# Patient Record
Sex: FEMALE | Race: OTHER
[De-identification: names, ages, dates, MRNs, and addresses within clinical notes are randomized per-mention and may not be internally consistent; named-entity substitution may affect disease eponyms.]

---

## 2018-12-13 ENCOUNTER — HOSPITAL ENCOUNTER (EMERGENCY)
Dept: HOSPITAL 62 - ER | Age: 18
Discharge: HOME | End: 2018-12-13
Payer: SELF-PAY

## 2018-12-13 VITALS — DIASTOLIC BLOOD PRESSURE: 75 MMHG | SYSTOLIC BLOOD PRESSURE: 95 MMHG

## 2018-12-13 DIAGNOSIS — F17.200: ICD-10-CM

## 2018-12-13 DIAGNOSIS — J02.9: ICD-10-CM

## 2018-12-13 DIAGNOSIS — B34.9: Primary | ICD-10-CM

## 2018-12-13 DIAGNOSIS — R42: ICD-10-CM

## 2018-12-13 DIAGNOSIS — R53.81: ICD-10-CM

## 2018-12-13 PROCEDURE — 87880 STREP A ASSAY W/OPTIC: CPT

## 2018-12-13 PROCEDURE — 87070 CULTURE OTHR SPECIMN AEROBIC: CPT

## 2018-12-13 PROCEDURE — 87077 CULTURE AEROBIC IDENTIFY: CPT

## 2018-12-13 PROCEDURE — 86308 HETEROPHILE ANTIBODY SCREEN: CPT

## 2018-12-13 PROCEDURE — 36415 COLL VENOUS BLD VENIPUNCTURE: CPT

## 2018-12-13 PROCEDURE — 99282 EMERGENCY DEPT VISIT SF MDM: CPT

## 2018-12-13 NOTE — ER DOCUMENT REPORT
ED General





- General


Chief Complaint: Dizziness


Stated Complaint: DIZZY, BLURRED VISION, NECK/THROAT PAIN


Time Seen by Provider: 12/13/18 11:08


Mode of Arrival: Ambulatory


Information source: Patient


Notes: 





Chief complaint: Sore throat








History of complain:( obtained from----patient) 18 years old female presents 

today with sore throat fever chills general aches and pain malaise dizzy etc.  

For the last 2 days.








Onset: Gradual


Duration: Last 2 days


Severity: Moderate


Quality: Achy


Context: As above


Exacerbating factor and relieving factors: As above











REVIEW OF SYSTEMS:


CONSTITUTIONAL :  Denies fever,  chills, or sweats.  Denies recent illness.


EENT:   Denies eye, ear, throat, or mouth pain or symptoms.  Denies nasal or 

sinus congestion or discharge.  Denies throat, tongue, or mouth swelling or 

difficulty swallowing.


CARDIOVASCULAR:  Denies chest pain.  Denies palpitations or racing or irregular 

heart beat.  Denies ankle edema.


RESPIRATORY:  Denies cough, cold, or chest congestion.  Denies shortness of 

breath, difficulty breathing, or wheezing.


GASTROINTESTINAL:  Denies  distention.  Denies nausea, vomiting, or diarrhea.  

Denies blood in vomitus, stools, or per rectum.  Denies black, tarry stools.  

Denies constipation. 


GENITOURINARY:  Denies difficulty urinating, painful urination, burning, 

frequency, blood in urine, or discharge.


FEMALE  GENITOURINARY:  Denies vaginal bleeding, heavy or abnormal periods, 

irregular periods.  Denies vaginal discharge or odor. 


MUSCULOSKELETAL:  Denies back or neck pain or stiffness.  Denies joint pain or 

swelling.


SKIN:   Denies rash, lesions or sores.


HEMATOLOGIC :   Denies easy bruising or bleeding.


LYMPHATIC:  Denies swollen, enlarged glands.


NEUROLOGICAL:  Denies confusion or altered mental status.  Denies passing out 

or loss of consciousness.  Denies dizziness or lightheadedness.  Denies 

headache.  Denies weakness or paralysis or loss of use of either side.  Denies 

problems with gait or speech.  Denies sensory loss, numbness, or tingling.  

Denies seizures.


PSYCHIATRIC:  Denies anxiety or stress.  Denies depression, suicidal ideation, 

or homicidal ideation.





ALL OTHER SYSTEMS REVIEWED AND NEGATIVE.











PHYSICAL EXAMINATION:





GENERAL: Well-appearing, well-nourished and in no acute distress.





HEAD: Atraumatic, normocephalic.





EYES: Pupils equal round and reactive to light, extraocular movements intact, 

conjunctiva are normal.





ENT: Nares patent, orophar pharyngeal mucosa and tonsils erythematous with 

exudates with tonsillar enlargement..  Moist mucous membranes.





NECK: Normal range of motion, upper anterior cervical lymphadenopathy





LUNGS: Breath sounds clear to auscultation bilaterally and equal.  No wheezes 

rales or rhonchi.





HEART: Regular rate and rhythm without murmurs





ABDOMEN: Soft, nontender, nondistended abdomen.  No guarding, no rebound.  No 

masses appreciated.





Examination of genitals-deferred





Musculoskeletal: Normal range of motion, no pitting or edema.  No cyanosis.





NEUROLOGICAL: Cranial nerves grossly intact.  Normal speech, normal gait.  

Normal sensory, motor exams





PSYCH: Normal mood, normal affect.





SKIN: Warm, Dry, normal turgor, no rashes or lesions noted.

















Dictation was performed using Dragon voice recognition software


TRAVEL OUTSIDE OF THE [place] IN LAST 30 DAYS: No





- HPI


Onset/Duration: Gradual


Notes: 





Dictated





- Related Data


Allergies/Adverse Reactions: 


 





No Known Allergies Allergy (Unverified 12/13/18 10:32)


 











Past Medical History





- Social History


Smoking Status: Current Every Day Smoker


Frequency of alcohol use: Rare


Drug Abuse: None


Lives with: Family


Family History: Reviewed & Not Pertinent





Review of Systems





- Review of Systems


Notes: 





Dictated





Physical Exam





- Vital signs


Vitals: 


 











Temp Pulse Resp BP Pulse Ox


 


 99.3 F   115 H  20   106/67   97 


 


 12/13/18 10:34  12/13/18 10:34  12/13/18 10:34  12/13/18 10:34  12/13/18 10:34














- Notes


Notes: 





Dictated





Course





- Vital Signs


Vital signs: 


 











Temp Pulse Resp BP Pulse Ox


 


 99.3 F   115 H  20   106/67   97 


 


 12/13/18 10:34  12/13/18 10:34  12/13/18 10:34  12/13/18 10:34  12/13/18 10:34














Discharge





- Discharge


Clinical Impression: 


 Viral syndrome





Pharyngitis


Qualifiers:


 Pharyngitis/tonsillitis etiology: unspecified etiology Qualified Code(s): 

J02.9 - Acute pharyngitis, unspecified





Condition: Fair


Disposition: HOME, SELF-CARE


Instructions:  Viral Syndrome ([facility]), Fever ([facility]), Dizziness ([facility])


Prescriptions: 


Amoxicillin 1 tab PO QID #40 tab

## 2019-08-31 ENCOUNTER — HOSPITAL ENCOUNTER (EMERGENCY)
Dept: HOSPITAL 62 - ER | Age: 19
Discharge: HOME | End: 2019-08-31
Payer: SELF-PAY

## 2019-08-31 VITALS — SYSTOLIC BLOOD PRESSURE: 108 MMHG | DIASTOLIC BLOOD PRESSURE: 59 MMHG

## 2019-08-31 DIAGNOSIS — F17.200: ICD-10-CM

## 2019-08-31 DIAGNOSIS — R42: ICD-10-CM

## 2019-08-31 DIAGNOSIS — R00.2: Primary | ICD-10-CM

## 2019-08-31 DIAGNOSIS — M54.6: ICD-10-CM

## 2019-08-31 LAB
ADD MANUAL DIFF: NO
ALBUMIN SERPL-MCNC: 5.1 G/DL (ref 3.5–5)
ALP SERPL-CCNC: 81 U/L (ref 38–126)
ANION GAP SERPL CALC-SCNC: 13 MMOL/L (ref 5–19)
AST SERPL-CCNC: 77 U/L (ref 14–36)
BASOPHILS # BLD AUTO: 0.1 10^3/UL (ref 0–0.2)
BASOPHILS NFR BLD AUTO: 0.7 % (ref 0–2)
BILIRUB DIRECT SERPL-MCNC: 0.3 MG/DL (ref 0–0.4)
BILIRUB SERPL-MCNC: 0.7 MG/DL (ref 0.2–1.3)
BUN SERPL-MCNC: 12 MG/DL (ref 7–20)
CALCIUM: 9.9 MG/DL (ref 8.4–10.2)
CHLORIDE SERPL-SCNC: 104 MMOL/L (ref 98–107)
CO2 SERPL-SCNC: 26 MMOL/L (ref 22–30)
EOSINOPHIL # BLD AUTO: 0 10^3/UL (ref 0–0.6)
EOSINOPHIL NFR BLD AUTO: 0.4 % (ref 0–6)
ERYTHROCYTE [DISTWIDTH] IN BLOOD BY AUTOMATED COUNT: 13.7 % (ref 11.5–14)
GLUCOSE SERPL-MCNC: 87 MG/DL (ref 75–110)
HCT VFR BLD CALC: 39.1 % (ref 36–47)
HGB BLD-MCNC: 13.1 G/DL (ref 12–15.5)
LYMPHOCYTES # BLD AUTO: 2.4 10^3/UL (ref 0.5–4.7)
LYMPHOCYTES NFR BLD AUTO: 25.2 % (ref 13–45)
MCH RBC QN AUTO: 28.9 PG (ref 27–33.4)
MCHC RBC AUTO-ENTMCNC: 33.5 G/DL (ref 32–36)
MCV RBC AUTO: 86 FL (ref 80–97)
MONOCYTES # BLD AUTO: 0.5 10^3/UL (ref 0.1–1.4)
MONOCYTES NFR BLD AUTO: 5.2 % (ref 3–13)
NEUTROPHILS # BLD AUTO: 6.5 10^3/UL (ref 1.7–8.2)
NEUTS SEG NFR BLD AUTO: 68.5 % (ref 42–78)
PLATELET # BLD: 295 10^3/UL (ref 150–450)
POTASSIUM SERPL-SCNC: 4.4 MMOL/L (ref 3.6–5)
PROT SERPL-MCNC: 8.8 G/DL (ref 6.3–8.2)
RBC # BLD AUTO: 4.53 10^6/UL (ref 3.72–5.28)
TOTAL CELLS COUNTED % (AUTO): 100 %
WBC # BLD AUTO: 9.5 10^3/UL (ref 4–10.5)

## 2019-08-31 PROCEDURE — 96361 HYDRATE IV INFUSION ADD-ON: CPT

## 2019-08-31 PROCEDURE — 80053 COMPREHEN METABOLIC PANEL: CPT

## 2019-08-31 PROCEDURE — 36415 COLL VENOUS BLD VENIPUNCTURE: CPT

## 2019-08-31 PROCEDURE — 93005 ELECTROCARDIOGRAM TRACING: CPT

## 2019-08-31 PROCEDURE — 84703 CHORIONIC GONADOTROPIN ASSAY: CPT

## 2019-08-31 PROCEDURE — 71046 X-RAY EXAM CHEST 2 VIEWS: CPT

## 2019-08-31 PROCEDURE — 93010 ELECTROCARDIOGRAM REPORT: CPT

## 2019-08-31 PROCEDURE — 84443 ASSAY THYROID STIM HORMONE: CPT

## 2019-08-31 PROCEDURE — 83735 ASSAY OF MAGNESIUM: CPT

## 2019-08-31 PROCEDURE — 99284 EMERGENCY DEPT VISIT MOD MDM: CPT

## 2019-08-31 PROCEDURE — 96374 THER/PROPH/DIAG INJ IV PUSH: CPT

## 2019-08-31 PROCEDURE — 85025 COMPLETE CBC W/AUTO DIFF WBC: CPT

## 2019-08-31 NOTE — ER DOCUMENT REPORT
ED General





- General


Chief Complaint: Near Syncope


Stated Complaint: DIZZY


Time Seen by Provider: 08/31/19 16:07


Primary Care Provider: 


QUOC GRIFFIN MD [Primary Care Provider] - Follow up as needed


Mode of Arrival: Ambulatory


TRAVEL OUTSIDE OF THE [place] IN LAST 30 DAYS: No





- HPI


Notes: 





Patient is a 18-year-old female with no significant past medical history who 

presents complaining of intermittent episodes of palpitations and feeling a 

little lightheaded over the past couple years but has increased in frequency to 

1-2 times per week over the past 1 to 2 months.  Patient states that she had an 

incident prior to arrival where she had a near syncopal event, but did not 

completely lose consciousness.  Patient states that that is the furthest it has 

progressed out of her previous incidences.  Patient states that she did have 

some soreness to her left thoracic area during this time as well as 

palpitations.  Patient states that she currently just feels fatigued at this 

time and her other symptoms have resolved.  She has been able to eat and drink 

without difficulty.  She is urinating normally and having normal bowel 

movements.  She does not take any medicines daily.  She does admit to smoking on

occasion, but denies any drugs or alcohol.  She has not been seen by a 

specialist for this issue previously.  Denies drug allergies.  Denies any 

headache, fever, head injury, neck pain, changes in 

vision/speech/mentation/hearing, URI, sore throat, chest pain, palpitations, 

cough, shortness of breath, wheeze, dyspnea, abdominal pain, nausea/vom

iting/diarrhea, urinary retention, dysuria, hematuria, loss of control of bowel 

or bladder, numbness/tingling, saddle anesthesia, muscle paralysis/weakness, or 

rash.





- Related Data


Allergies/Adverse Reactions: 


                                        





No Known Allergies Allergy (Unverified 12/13/18 10:32)


   











Past Medical History





- General


Information source: Patient





- Social History


Smoking Status: Current Some Day Smoker


Family History: Reviewed & Not Pertinent


Patient has suicidal ideation: No


Patient has homicidal ideation: No


Renal/ Medical History: Denies: Hx Peritoneal Dialysis





Review of Systems





- Review of Systems


-: Yes All other systems reviewed and negative





Physical Exam





- Vital signs


Vitals: 


                                        











Temp Pulse Resp BP Pulse Ox


 


 98.5 F   88   14   117/78   98 


 


 08/31/19 16:07  08/31/19 16:07  08/31/19 16:07  08/31/19 16:07  08/31/19 16:07














- Notes


Notes: 





PHYSICAL EXAMINATION:  





GENERAL: Well-appearing, well-nourished and in no acute distress.  A&Ox4.  

Answers questions appropriately.





HEAD: Atraumatic, normocephalic.  Non-tender. 





EYES: Pupils equal round and reactive to light, extraocular movements intact, 

sclera anicteric, conjunctiva are normal.  No nystagmus.  





ENT: EAC clear b/l.  TM's intact b/l without erythema, fluid, or perforation.  

Nares patent and without discharge.  oropharynx clear without exudates.  No 

tonsilar hypertrophy or erythema.  Moist mucous membranes.  No sinus tenderness.







NECK: Normal range of motion, supple without lymphadenopathy.  No rigidity/meni

ngismus.  No midline tenderness. 





LUNGS: Breath sounds clear to auscultation bilaterally and equal.  No wheezes 

rales or rhonchi.





HEART: Regular rate and rhythm at this time with occ physiologic s2 noted with 

respirations.  She did show a sinus regular irregular rhythm on EKG.





ABDOMEN: Soft, nontender, nondistended abdomen.  No guarding, no rebound.  

Normal bowel sounds present.  No CVA tenderness bilaterally.  





Musculoskeletal: Ext b/l:  FROM to passive/active. Strength 5+/5.  No deficits n

oted.  No bony tenderness of extremities.





Extremities:  No cyanosis, clubbing, or edema b/l.  Peripheral pulses 2+.  

Capillary refill less than 2 seconds.





NEUROLOGICAL: NIH 0.  GCS 15.  Cranial nerves grossly intact.  Normal speech, 

normal gait.  Normal sensory, motor exams.  Reflexes 2+ b/l. DOTTY's negative.  

Pronator drift negative. Heel/shin, finger/nose wnl. Romberg neg.





PSYCH: Normal mood, normal affect.





SKIN: Warm, Dry, normal turgor, no rashes or lesions noted.





Course





- Re-evaluation


Re-evalutation: 





08/31/19 18:54


Patient is an afebrile, well-hydrated 18-year-old female who presents to the ED 

with near syncopal event/palpitations.  Vitals are acceptable without any 

significant tachycardia, tachypnea, or hypoxia.  PE is otherwise unremarkable 

for any focal neurological deficits.  Patient is nontoxic-appearing and is 

tolerating p.o. without any difficulties.  Pt is currently asymptomatic.  

Patient has not had any recurrences of symptoms throughout her stay.  

Orthostatics negative.  CBC, CMP, Thyroid, Magnesium, HCG, EKG, chest x-ray are 

all unremarkable for any acute pathology.  Patient has a Wells score of 0, and 

is PERC negative.  Patient does not have any chest pain, dyspnea, or shortness 

of breath.  Patient's presentation and symptomatology creates low suspicion for 

ACS, PE, pneumothorax, pericarditis, dissection, respiratory compromise, severe 

dehydration, sepsis, meningitis, acute intracranial pathology, or other systemic

emergent condition at this time.  Patient is aware that this condition can 

change from initial presentation and she needs to monitor symptoms closely and 

seek medical attention for any acute changes.  Pt is feeling better and would 

like to go home.  Recommend conservative measures for symptoms.  Recheck with 

your PCM in 2-3 days. Consider consult with Cardiology.  Reviewed possible 

Holter monitor/event monitor.  Return to the ED with any worsening/concerning 

symptoms otherwise as reviewed in discharge.  Patient is in agreement.





- Vital Signs


Vital signs: 


                                        











Temp Pulse Resp BP Pulse Ox


 


 98.5 F   61   14   97/61 L  98 


 


 08/31/19 16:07  08/31/19 17:46  08/31/19 16:07  08/31/19 17:46  08/31/19 16:07














- Laboratory


Result Diagrams: 


                                 08/31/19 16:55





                                 08/31/19 16:55


Laboratory results interpreted by me: 


                                        











  08/31/19





  16:55


 


AST  77 H


 


Total Protein  8.8 H


 


Albumin  5.1 H














Discharge





- Discharge


Clinical Impression: 


 Near syncope, Palpitations





Condition: Stable


Disposition: HOME, SELF-CARE


Instructions:  Palpitations (Irregular or Rapid Heartrate) ([facility]), Near Syncopal 

Episode ([facility])


Additional Instructions: 


Maintain adequate fluid and food intake


Healthy diet


Monitor blood pressure daily and keep a log


Monitor symptoms for any acute changes


Consider Holter monitor/cardiac event monitor


Recheck with your PCM in 2-3 days


Consider a follow-up with cardiology 


Return to the ED with any worsening symptoms and/or development of fever, 

headache, chest pain, palpitations, syncope, shortness of breath, trouble 

breathing, abdominal pain, n/v/d, blood in stool/urine, loss of control of 

bowel/bladder, urinary retention, muscle weakness/paralysis, numbness/tingling, 

or other worsening symptoms that are concerning to you.


Forms:  Smoking Cessation Education


Referrals: 


QUOC GRIFFIN MD [Primary Care Provider] - Follow up as needed


DIXIE YUAN MD [ACTIVE STAFF] - Follow up as needed


MELLY ANTUNEZ MD [ACTIVE STAFF] - Follow up as needed

## 2019-08-31 NOTE — ER DOCUMENT REPORT
ED Medical Screen (RME)





- General


Chief Complaint: Near Syncope


Stated Complaint: DIZZY


Time Seen by Provider: 08/31/19 16:07


Mode of Arrival: Ambulatory


Information source: Patient


Notes: 





Patient presents complaining of dizziness that started this afternoon.  Patient 

complains of left side rib and posterior thoracic back pain.  Patient denies any

anterior chest pain shortness of breath.  Patient does complain of some nausea.





hx: None





I have greeted and performed a rapid initial assessment of this patient.  A 

comprehensive ED assessment and evaluation of the patient, analysis of test r

esults and completion of the medical decision making process will be conducted 

by additional ED providers.


TRAVEL OUTSIDE OF THE [place] IN LAST 30 DAYS: No





- Related Data


Allergies/Adverse Reactions: 


                                        





No Known Allergies Allergy (Unverified 12/13/18 10:32)


   











Past Medical History


Renal/ Medical History: Denies: Hx Peritoneal Dialysis





Physical Exam





- Vital signs


Vitals: 





                                        











Temp Pulse Resp BP Pulse Ox


 


 98.5 F   88   14   117/78   98 


 


 08/31/19 16:07  08/31/19 16:07  08/31/19 16:07  08/31/19 16:07  08/31/19 16:07














- Cardiovascular


Rhythm: Regular


Heart sounds: S1 appreciated, S2 appreciated


Murmur: No





Course





- Vital Signs


Vital signs: 





                                        











Temp Pulse Resp BP Pulse Ox


 


 98.5 F   88   14   117/78   98 


 


 08/31/19 16:07  08/31/19 16:07  08/31/19 16:07  08/31/19 16:07  08/31/19 16:07

## 2019-08-31 NOTE — RADIOLOGY REPORT (SQ)
EXAM DESCRIPTION:  CHEST 2 VIEWS



COMPLETED DATE/TIME:  8/31/2019 5:18 pm



REASON FOR STUDY:  L thoracic back pain



COMPARISON:  None.



EXAM PARAMETERS:  NUMBER OF VIEWS: two views

TECHNIQUE: Digital Frontal and Lateral radiographic views of the chest acquired.

RADIATION DOSE: NA

LIMITATIONS: none



FINDINGS:  LUNGS AND PLEURA: No opacities, masses or pneumothorax. No pleural effusion.

MEDIASTINUM AND HILAR STRUCTURES: No masses or contour abnormalities.

HEART AND VASCULAR STRUCTURES: Heart normal size.  No evidence for failure.

BONES: No acute findings.

HARDWARE: None in the chest.

OTHER: No other significant finding.



IMPRESSION:  NO ACUTE RADIOGRAPHIC FINDING IN THE CHEST.



TECHNICAL DOCUMENTATION:  JOB ID:  [id number]

[id number]

 2011 [employer]- All Rights Reserved



Reading location - IP/workstation name: [email or web address]

## 2019-08-31 NOTE — EKG REPORT
SEVERITY:- BORDERLINE ECG -

SINUS ARRHYTHMIA, RATE 60-85

BORDERLINE T ABNORMALITIES, ANT-LAT LEADS

:

Confirmed by: Nate Tovar MD 31-Aug-2019 21:08:36

## 2019-09-29 ENCOUNTER — HOSPITAL ENCOUNTER (EMERGENCY)
Dept: HOSPITAL 62 - ER | Age: 19
Discharge: HOME | End: 2019-09-29
Payer: SELF-PAY

## 2019-09-29 VITALS — SYSTOLIC BLOOD PRESSURE: 99 MMHG | DIASTOLIC BLOOD PRESSURE: 68 MMHG

## 2019-09-29 DIAGNOSIS — R20.0: ICD-10-CM

## 2019-09-29 DIAGNOSIS — R06.02: ICD-10-CM

## 2019-09-29 DIAGNOSIS — I95.1: Primary | ICD-10-CM

## 2019-09-29 DIAGNOSIS — R07.9: ICD-10-CM

## 2019-09-29 LAB
ADD MANUAL DIFF: NO
ALBUMIN SERPL-MCNC: 4.8 G/DL (ref 3.5–5)
ALP SERPL-CCNC: 69 U/L (ref 38–126)
ANION GAP SERPL CALC-SCNC: 8 MMOL/L (ref 5–19)
APPEARANCE UR: CLEAR
APTT PPP: YELLOW S
AST SERPL-CCNC: 28 U/L (ref 14–36)
BASOPHILS # BLD AUTO: 0.1 10^3/UL (ref 0–0.2)
BASOPHILS NFR BLD AUTO: 0.7 % (ref 0–2)
BILIRUB DIRECT SERPL-MCNC: 0 MG/DL (ref 0–0.4)
BILIRUB SERPL-MCNC: 0.8 MG/DL (ref 0.2–1.3)
BILIRUB UR QL STRIP: NEGATIVE
BUN SERPL-MCNC: 12 MG/DL (ref 7–20)
CALCIUM: 9.4 MG/DL (ref 8.4–10.2)
CHLORIDE SERPL-SCNC: 104 MMOL/L (ref 98–107)
CO2 SERPL-SCNC: 27 MMOL/L (ref 22–30)
EOSINOPHIL # BLD AUTO: 0.1 10^3/UL (ref 0–0.6)
EOSINOPHIL NFR BLD AUTO: 1.1 % (ref 0–6)
ERYTHROCYTE [DISTWIDTH] IN BLOOD BY AUTOMATED COUNT: 13.4 % (ref 11.5–14)
GLUCOSE SERPL-MCNC: 87 MG/DL (ref 75–110)
GLUCOSE UR STRIP-MCNC: NEGATIVE MG/DL
HCT VFR BLD CALC: 38.7 % (ref 36–47)
HGB BLD-MCNC: 13.1 G/DL (ref 12–15.5)
KETONES UR STRIP-MCNC: NEGATIVE MG/DL
LYMPHOCYTES # BLD AUTO: 2.4 10^3/UL (ref 0.5–4.7)
LYMPHOCYTES NFR BLD AUTO: 31.2 % (ref 13–45)
MCH RBC QN AUTO: 29.5 PG (ref 27–33.4)
MCHC RBC AUTO-ENTMCNC: 33.7 G/DL (ref 32–36)
MCV RBC AUTO: 87 FL (ref 80–97)
MONOCYTES # BLD AUTO: 0.5 10^3/UL (ref 0.1–1.4)
MONOCYTES NFR BLD AUTO: 6.2 % (ref 3–13)
NEUTROPHILS # BLD AUTO: 4.7 10^3/UL (ref 1.7–8.2)
NEUTS SEG NFR BLD AUTO: 60.8 % (ref 42–78)
NITRITE UR QL STRIP: NEGATIVE
PH UR STRIP: 7 [PH] (ref 5–9)
PLATELET # BLD: 285 10^3/UL (ref 150–450)
POTASSIUM SERPL-SCNC: 4 MMOL/L (ref 3.6–5)
PROT SERPL-MCNC: 8.3 G/DL (ref 6.3–8.2)
PROT UR STRIP-MCNC: NEGATIVE MG/DL
RBC # BLD AUTO: 4.43 10^6/UL (ref 3.72–5.28)
SP GR UR STRIP: 1.01
TOTAL CELLS COUNTED % (AUTO): 100 %
UROBILINOGEN UR-MCNC: 2 MG/DL (ref ?–2)
WBC # BLD AUTO: 7.7 10^3/UL (ref 4–10.5)

## 2019-09-29 PROCEDURE — 99285 EMERGENCY DEPT VISIT HI MDM: CPT

## 2019-09-29 PROCEDURE — 93005 ELECTROCARDIOGRAM TRACING: CPT

## 2019-09-29 PROCEDURE — 84703 CHORIONIC GONADOTROPIN ASSAY: CPT

## 2019-09-29 PROCEDURE — 80053 COMPREHEN METABOLIC PANEL: CPT

## 2019-09-29 PROCEDURE — 93010 ELECTROCARDIOGRAM REPORT: CPT

## 2019-09-29 PROCEDURE — 36415 COLL VENOUS BLD VENIPUNCTURE: CPT

## 2019-09-29 PROCEDURE — 81001 URINALYSIS AUTO W/SCOPE: CPT

## 2019-09-29 PROCEDURE — 85025 COMPLETE CBC W/AUTO DIFF WBC: CPT

## 2019-09-29 PROCEDURE — 71046 X-RAY EXAM CHEST 2 VIEWS: CPT

## 2019-09-29 NOTE — ER DOCUMENT REPORT
ED Medical Screen (RME)





- General


Chief Complaint: Near Syncope


Stated Complaint: CHEST PAIN


Time Seen by Provider: 09/29/19 13:45


Primary Care Provider: 


QUOC GRIFFIN MD [Primary Care Provider] - Follow up as needed


Information source: Patient


Notes: 





Patient presents complaining of feeling faint.  Patient reports left-sided chest

pain that is presently resolved.  Patient reports tingling to the left arm and 

left lower leg.  Patient also complains of headache as well.





I have greeted and performed a rapid initial assessment of this patient.  A 

comprehensive ED assessment and evaluation of the patient, analysis of test 

results and completion of the medical decision making process will be conducted 

by additional ED providers.


TRAVEL OUTSIDE OF THE [place] IN LAST 30 DAYS: No





- Related Data


Allergies/Adverse Reactions: 


                                        





No Known Allergies Allergy (Verified 09/29/19 13:43)


   











Past Medical History





- Social History


Chew tobacco use (# tins/day): No


Frequency of alcohol use: None


Drug Abuse: None


Renal/ Medical History: Denies: Hx Peritoneal Dialysis





Physical Exam





- Vital signs


Vitals: 





                                        











Temp Pulse Resp BP Pulse Ox


 


 98.5 F   77   16   99/68 L  99 


 


 09/29/19 13:21 09/29/19 13:21 09/29/19 13:21 09/29/19 13:21 09/29/19 13:21














- Cardiovascular


Rhythm: Regular


Heart sounds: S1 appreciated, S2 appreciated





Course





- Vital Signs


Vital signs: 





                                        











Temp Pulse Resp BP Pulse Ox


 


 98.5 F   77   16   99/68 L  99 


 


 09/29/19 13:21 09/29/19 13:21 09/29/19 13:21 09/29/19 13:21 09/29/19 13:21














Doctor's Discharge





- Discharge


Referrals: 


QUOC GRIFFIN MD [Primary Care Provider] - Follow up as needed

## 2019-09-29 NOTE — RADIOLOGY REPORT (SQ)
EXAM DESCRIPTION:  CHEST 2 VIEWS



COMPLETED DATE/TIME:  9/29/2019 2:54 pm



REASON FOR STUDY:  cp



COMPARISON:  Two-view chest 8/31/2019



EXAM PARAMETERS:  NUMBER OF VIEWS: two views

TECHNIQUE: Digital Frontal and Lateral radiographic views of the chest acquired.

RADIATION DOSE: NA

LIMITATIONS: none



FINDINGS:  LUNGS AND PLEURA: No opacities, masses or pneumothorax. No pleural effusion.

MEDIASTINUM AND HILAR STRUCTURES: No masses or contour abnormalities.

HEART AND VASCULAR STRUCTURES: Heart normal size.  No evidence for failure.

BONES: No acute findings.

HARDWARE: None in the chest.

OTHER: No other significant finding.



IMPRESSION:  NO ACUTE RADIOGRAPHIC FINDING IN THE CHEST.



TECHNICAL DOCUMENTATION:  JOB ID:  [id number]

 2011 [email or web address]- All Rights Reserved



Reading location - IP/workstation name: [email or web address]

## 2019-09-29 NOTE — ER DOCUMENT REPORT
ED General





- General


Chief Complaint: Near Syncope


Stated Complaint: CHEST PAIN


Time Seen by Provider: 09/29/19 13:45


Primary Care Provider: 


QUOC GRIFFIN MD [Primary Care Provider] - Follow up as needed


TRAVEL OUTSIDE OF THE [place] IN LAST 30 DAYS: No





- HPI


Notes: 





Patient is a 18-year-old female that presents to the emergency department for 

chief complaint of near syncope and chest pain.


Patient states today while sitting in a car she began to have a "stabbing" pain 

on the left side of her chest.  The pain did not radiate.  She states she then 

felt some shortness of breath and numbness in her left arm and leg.  The 

symptoms lasted for about 2 hours prior to completely resolving.  Currently she 

states she is having a mild diffuse headache but otherwise feels normal.  She 

states that when the pain was there she felt like she was going to pass out and 

was lightheaded with position changes.  Patient states she has had similar 

presentations a few weeks ago and had been referred to cardiology but has not 

yet followed up.  Prior to onset of symptoms patient had been outside fishing in

90 degree weather for a few hours.  She states she has not had much to eat or 

drink today.  She denies any fever, chills, nausea and vomiting.  She denies any

recent surgery, travel, immobilization, lower extremity cramping or swelling, or

exogenous estrogen use.  She has no history of cancer.





Past Medical History: Negative





Past Surgical History: Negative





Social History: Denies drugs alcohol and tobacco





Family History: Reviewed and noncontributory for presenting illness





Allergies: Reviewed, see documented allergy list.








REVIEW OF SYSTEMS:





CONSTITUTIONAL : 





No fever





No chills





No diaphoresis





No recent illness





EENT:





No vision changes





No congestion





No sore throat  





CARDIOVASCULAR:





 chest pain





No palpitations





RESPIRATORY:





 shortness of breath





No cough





No difficulty breathing





GASTROINTESTINAL: 





No abdominal pain





No nausea





No vomiting





No diarrhea





GENITOURINARY:





No dysuria





No hematuria





No difficulty urinating





MUSCULOSKELETAL:





No back pain





No leg pain





No arm pain





SKIN:  





No rashes





No lesions





LYMPHATIC: 





No swollen, enlarged glands.





NEUROLOGICAL: 





 lightheadedness





 headache





No weakness





No paresthesias





PSYCHIATRIC:





No anxiety





No depression 








PHYSICAL EXAMINATION:





Vital signs reviewed, nursing noted reviewed.





GENERAL: Well-appearing, well-nourished and in no acute distress.





HEAD: Atraumatic, normocephalic.





EYES: Eyes appear normal, extraocular movements intact, sclera anicteric, 

conjunctiva are normal.





ENT: nares patent, oropharynx clear without exudates.  Moist mucous membranes.





NECK: Normal range of motion, supple without lymphadenopathy





LUNGS: Breath sounds clear to auscultation bilaterally and equal.  No wheezes 

rales or rhonchi.





HEART: Regular rate and rhythm without murmurs, +2/4 bilateral radial and DP 

pulses





ABDOMEN: Soft, nontender, normoactive bowel sounds.  No rebound, guarding, or 

rigidity. No masses appreciated.





EXTREMITIES: Nontender, good range of motion, no pitting or edema. 





NEUROLOGICAL: No focal neurological deficits. Moves all extremities 

spontaneously Motor and sensory grossly intact on exam.





PSYCH: Normal mood, normal affect.





SKIN: Warm, Dry, normal turgor, no rashes or lesions noted on exposed skin











- Related Data


Allergies/Adverse Reactions: 


                                        





No Known Allergies Allergy (Verified 09/29/19 13:43)


   











Past Medical History





- General


Information source: Patient





- Social History


Smoking Status: Never Smoker


Chew tobacco use (# tins/day): No


Frequency of alcohol use: None


Drug Abuse: None


Family History: Reviewed & Not Pertinent


Patient has suicidal ideation: No


Patient has homicidal ideation: No


Renal/ Medical History: Denies: Hx Peritoneal Dialysis





Physical Exam





- Vital signs


Vitals: 





                                        











Temp Pulse Resp BP Pulse Ox


 


 98.5 F   77   16   99/68 L  99 


 


 09/29/19 13:21  09/29/19 13:21  09/29/19 13:21  09/29/19 13:21  09/29/19 13:21














Course





- Re-evaluation


Re-evalutation: 





09/29/19 18:05


Vitals reviewed.  Nursing notes reviewed.  Patient is well-appearing and in no 

acute distress.  Currently she is only having a mild headache which will be 

treated with Tylenol.  Her lab work today shows no acute anemia.  She has no 

leukocytosis to suggest underlying infection.  EKG shows no dysrhythmia or 

ectopy.  Patient had been exposed to 90 degree weather 4 hours prior to the 

onset of her lightheadedness and chest pain.  Her blood pressure usually runs in

the 90s.  Patient symptoms consistent with orthostatic hypotension.  She was 

counseled on hydration especially when exposed to heat.  Her chest pain is 

nonspecific however she is PERC criteria and Wells criteria negative and I am 

not clinically suspicious for PE.  Patient had previously been referred to 

cardiology for concern of dysrhythmia and palpitations which she was encouraged 

to keep for follow-up.  She will return for new or worsening symptoms.  She is 

stable for discharge.





Laboratory











  09/29/19 09/29/19 09/29/19





  15:06 15:06 15:06


 


WBC  7.7  


 


RBC  4.43  


 


Hgb  13.1  


 


Hct  38.7  


 


MCV  87  


 


MCH  29.5  


 


MCHC  33.7  


 


RDW  13.4  


 


Plt Count  285  


 


Lymph % (Auto)  31.2  


 


Mono % (Auto)  6.2  


 


Eos % (Auto)  1.1  


 


Baso % (Auto)  0.7  


 


Absolute Neuts (auto)  4.7  


 


Absolute Lymphs (auto)  2.4  


 


Absolute Monos (auto)  0.5  


 


Absolute Eos (auto)  0.1  


 


Absolute Basos (auto)  0.1  


 


Seg Neutrophils %  60.8  


 


Sodium   139.2 


 


Potassium   4.0 


 


Chloride   104 


 


Carbon Dioxide   27 


 


Anion Gap   8 


 


BUN   12 


 


Creatinine   0.69 


 


Est GFR ( Amer)   > 60 


 


Est GFR (MDRD) Non-Af   > 60 


 


Glucose   87 


 


Calcium   9.4 


 


Total Bilirubin   0.8 


 


Direct Bilirubin   0.0 


 


Neonat Total Bilirubin   Not Reportable 


 


Neonat Direct Bilirubin   Not Reportable 


 


Neonat Indirect Bili   Not Reportable 


 


AST   28 


 


ALT   17 


 


Alkaline Phosphatase   69 


 


Total Protein   8.3 H 


 


Albumin   4.8 


 


Serum HCG, Qual    NEGATIVE


 


Urine Color   


 


Urine Appearance   


 


Urine pH   


 


Ur Specific Gravity   


 


Urine Protein   


 


Urine Glucose (UA)   


 


Urine Ketones   


 


Urine Blood   


 


Urine Nitrite   


 


Urine Bilirubin   


 


Urine Urobilinogen   


 


Ur Leukocyte Esterase   


 


Urine WBC (Auto)   


 


Urine RBC (Auto)   


 


Urine Bacteria (Auto)   


 


Squamous Epi Cells Auto   


 


Urine Mucus (Auto)   


 


Urine Ascorbic Acid   














  09/29/19





  15:06


 


WBC 


 


RBC 


 


Hgb 


 


Hct 


 


MCV 


 


MCH 


 


MCHC 


 


RDW 


 


Plt Count 


 


Lymph % (Auto) 


 


Mono % (Auto) 


 


Eos % (Auto) 


 


Baso % (Auto) 


 


Absolute Neuts (auto) 


 


Absolute Lymphs (auto) 


 


Absolute Monos (auto) 


 


Absolute Eos (auto) 


 


Absolute Basos (auto) 


 


Seg Neutrophils % 


 


Sodium 


 


Potassium 


 


Chloride 


 


Carbon Dioxide 


 


Anion Gap 


 


BUN 


 


Creatinine 


 


Est GFR ( Amer) 


 


Est GFR (MDRD) Non-Af 


 


Glucose 


 


Calcium 


 


Total Bilirubin 


 


Direct Bilirubin 


 


Neonat Total Bilirubin 


 


Neonat Direct Bilirubin 


 


Neonat Indirect Bili 


 


AST 


 


ALT 


 


Alkaline Phosphatase 


 


Total Protein 


 


Albumin 


 


Serum HCG, Qual 


 


Urine Color  YELLOW


 


Urine Appearance  CLEAR


 


Urine pH  7.0


 


Ur Specific Gravity  1.012


 


Urine Protein  NEGATIVE


 


Urine Glucose (UA)  NEGATIVE


 


Urine Ketones  NEGATIVE


 


Urine Blood  SMALL H


 


Urine Nitrite  NEGATIVE


 


Urine Bilirubin  NEGATIVE


 


Urine Urobilinogen  2.0 H


 


Ur Leukocyte Esterase  NEGATIVE


 


Urine WBC (Auto)  3


 


Urine RBC (Auto)  2


 


Urine Bacteria (Auto)  TRACE


 


Squamous Epi Cells Auto  3


 


Urine Mucus (Auto)  RARE


 


Urine Ascorbic Acid  NEGATIVE











                                        





Chest X-Ray  09/29/19 13:45


IMPRESSION:  NO ACUTE RADIOGRAPHIC FINDING IN THE CHEST.


 














- Vital Signs


Vital signs: 





                                        











Temp Pulse Resp BP Pulse Ox


 


 98.5 F   77   16   99/68 L  99 


 


 09/29/19 13:21  09/29/19 13:21  09/29/19 13:21  09/29/19 13:21  09/29/19 13:21














- Laboratory


Result Diagrams: 


                                 09/29/19 15:06





                                 09/29/19 15:06


Laboratory results interpreted by me: 





                                        











  09/29/19 09/29/19





  15:06 15:06


 


Total Protein  8.3 H 


 


Urine Blood   SMALL H


 


Urine Urobilinogen   2.0 H














- EKG Interpretation by Me


Additional EKG results interpreted by me: 





09/29/19 18:06


Interpreted by myself


1318: Normal sinus rhythm, rate 78, normal axis, no ectopy, no STEMI





Discharge





- Discharge


Clinical Impression: 


 Orthostatic hypotension





Chest pain


Qualifiers:


 Chest pain type: unspecified Qualified Code(s): R07.9 - Chest pain, unspecified





Condition: Stable


Disposition: HOME, SELF-CARE


Instructions:  Orthostatic Hypotension ([facility]), Chest Pain of Unclear Cause ([facility])


Additional Instructions: 


Please return to the emergency department if you have any worsening, or concern 

of your symptoms.





Please return to the emergency department if you develop chest pain, difficulty 

breathing, severe abdominal pain, or ongoing vomiting.





Please follow-up with your primary care physician in 2-3 days and any other 

recommended physicians.





If prescribed, take all medications as directed. 





If you have any questions or concerns do not hesitate to return the emergency 

department for evaluation.





Make a follow-up appointment with Dr. Pires as previously recommended





Referrals: 


QUOC GRIFFIN MD [Primary Care Provider] - Follow up as needed

## 2019-12-04 ENCOUNTER — HOSPITAL ENCOUNTER (EMERGENCY)
Dept: HOSPITAL 62 - ER | Age: 19
Discharge: HOME | End: 2019-12-04
Payer: SELF-PAY

## 2019-12-04 VITALS — DIASTOLIC BLOOD PRESSURE: 63 MMHG | SYSTOLIC BLOOD PRESSURE: 82 MMHG

## 2019-12-04 DIAGNOSIS — R30.0: ICD-10-CM

## 2019-12-04 DIAGNOSIS — N76.0: Primary | ICD-10-CM

## 2019-12-04 DIAGNOSIS — B96.89: ICD-10-CM

## 2019-12-04 DIAGNOSIS — R39.15: ICD-10-CM

## 2019-12-04 LAB
APPEARANCE UR: (no result)
APTT PPP: YELLOW S
BACTERIA (WET MOUNT): (no result)
BILIRUB UR QL STRIP: NEGATIVE
CHLAM PCR: NOT DETECTED
EPITHELIALS (WET MOUNT): (no result)
GLUCOSE UR STRIP-MCNC: NEGATIVE MG/DL
KETONES UR STRIP-MCNC: (no result) MG/DL
NITRITE UR QL STRIP: NEGATIVE
PH UR STRIP: 5 [PH] (ref 5–9)
PROT UR STRIP-MCNC: 30 MG/DL
RBCS (WET MOUNT): (no result)
SP GR UR STRIP: 1.03
T.VAGINALIS (WET MOUNT): (no result)
UROBILINOGEN UR-MCNC: NEGATIVE MG/DL (ref ?–2)
WBCS (WET MOUNT): (no result)
YEAST (WET MOUNT): (no result)

## 2019-12-04 PROCEDURE — 87491 CHLMYD TRACH DNA AMP PROBE: CPT

## 2019-12-04 PROCEDURE — 99283 EMERGENCY DEPT VISIT LOW MDM: CPT

## 2019-12-04 PROCEDURE — 81025 URINE PREGNANCY TEST: CPT

## 2019-12-04 PROCEDURE — 87086 URINE CULTURE/COLONY COUNT: CPT

## 2019-12-04 PROCEDURE — 87591 N.GONORRHOEAE DNA AMP PROB: CPT

## 2019-12-04 PROCEDURE — 87210 SMEAR WET MOUNT SALINE/INK: CPT

## 2019-12-04 PROCEDURE — 81001 URINALYSIS AUTO W/SCOPE: CPT

## 2019-12-04 NOTE — ER DOCUMENT REPORT
HPI





- HPI


Time Seen by Provider: 12/04/19 10:06


Pain Level: 0


Notes: 





Patient is a 19-year-old female who presents complaining of urinary burning and 

urgency over the past week.  She has been using over-the-counter meds with 

minimal relief.  Patient states that she also has some vaginal discharge which 

could be a yeast infection.  Patient states that she is in a monogamous 

relationship and has no concern of STD or STI and does not want any significant 

evaluation for it (i.e. pelvic exam).  Patient is able to eat and drink without 

difficulty.  She is having normal bowel movements.  Denies drug allergies.  

Patient states that she does feel some suprapubic pressure but no sharp pains.  

No other concerns or complaints.  Denies any headache, fever, neck pain, URI, 

sore throat, chest pain, palpitations, syncope, cough, shortness of breath, 

wheeze, dyspnea, abdominal pain, nausea/vomiting/diarrhea, or rash.





- ROS


Systems Reviewed and Negative: Yes All other systems reviewed and negative





- REPRODUCTIVE


Reproductive: DENIES: Pregnant:





Past Medical History





- Social History


Smoking Status: Unknown if Ever Smoked


Family History: Reviewed & Not Pertinent


Patient has suicidal ideation: No


Patient has homicidal ideation: No


Renal/ Medical History: Denies: Hx Peritoneal Dialysis





[facility] Provider Document





- CONSTITUTIONAL


Agree With Documented VS: Yes


Notes: 





PHYSICAL EXAMINATION:





GENERAL: Well-appearing, well-nourished and in no acute distress.





LUNGS: Breath sounds clear to auscultation bilaterally and equal.  No wheezes 

rales or rhonchi.





HEART: Regular rate and rhythm without murmurs, rubs, gallops.





ABDOMEN: Soft, nontender, nondistended abdomen.  No guarding, no rebound.  No 

masses appreciated.  Normal bowel sounds present.  No CVA tenderness 

bilaterally.





Musculoskeletal: FROM to passive/active. Strength 5+/5. 





Extremities:  No cyanosis, clubbing, or edema b/l.  Peripheral pulses 2+.  

Capillary refill less than 3 seconds.





NEUROLOGICAL: Normal speech, normal gait. 





PSYCH: Normal mood, normal affect.





SKIN: Warm, Dry, normal turgor, no rashes or lesions noted.





- INFECTION CONTROL


TRAVEL OUTSIDE OF THE [place] IN LAST 30 DAYS: No





Course





- Re-evaluation


Re-evalutation: 





12/04/19 11:43


Patient is an afebrile, well-hydrated, 19-year-old female who presents to the ED

with BV and dysuria.  Vitals are acceptable without any significant tachycardia,

tachypnea, or hypoxia.  PE is otherwise unremarkable.  Urinalysis and hCG are 

unremarkable for any acute pathology.  See wet mount results.  Chlam/gonorrhea 

tests are pending.  Patient declined wanting any treatment for 

chlamydia/gonorrhea at this time and is aware that she may have the return if 

any test comes back positive.  Patient is nontoxic-appearing is tolerating p.o. 

without any difficulties. Abd is otherwise soft with no focal tenderness.  No 

other labs or imaging warranted at this time based on H&P.  Low suspicion/risk 

for acute appendicitis, bowel obstruction, acute cholecystitis, acute 

cholangitis, perforated diverticulitis, incarcerated hernia, pancreatitis, 

perforated ulcer, peritonitis, sepsis, pelvic inflammatory disease, ectopic 

pregnancy, tubo-ovarian abscess, ovarian torsion, or other systemic emergent 

condition at this time.  Patient is aware that her condition can change from 

initial presentation and she needs to monitor symptoms closely and seek medical 

attention if any acute changes.  I will send her home with prescription for 

Flagyl.  Conservative measures otherwise for symptoms.  Recheck with your 

PCM/OBGYN in 3-5 days.  Return to the ED with any worsening/concerning symptoms 

otherwise as reviewed in discharge.  Patient is in agreement.





- Vital Signs


Vital signs: 


                                        











Temp Pulse Resp BP Pulse Ox


 


 98.4 F   87   18   109/69   97 


 


 12/04/19 09:55  12/04/19 09:55  12/04/19 09:55  12/04/19 09:55  12/04/19 09:55














Discharge





- Discharge


Clinical Impression: 


 Bacterial vaginosis





Condition: Stable


Disposition: HOME, SELF-CARE


Instructions:  Metronidazole ([facility]), Vaginosis, Bacterial ([facility])


Additional Instructions: 


Maintain fluid intake


Proper hygienic technique


Keep the skin clean


Safe sexual practices with condoms everytime


Tylenol/ibuprofen as needed


Check in with the health department this week for further testing if warranted


Your chlamydia/gonorrhea tests are pending and you will be notified if positive 

results; you may call in 1 day for the results as well


F/u with your PCM/OBGYN in 3-5 days for a recheck


Return to the ED with any development of HA/fever, trouble with vision, eye 

redness, worsening pain, urethral discharge, urinary retention, blood in the 

urine, flank pain, abdominal pain, n/v, Chest Pain, shortness of breath, joint 

pains, trouble breathing, or any other worsening/concerning symptoms as needed 

otherwise.


Referrals: 


QUOC GRIFFIN MD [Primary Care Provider] - Follow up as needed


[facility] [Provider Group] - Follow up as needed